# Patient Record
Sex: MALE | Race: WHITE | NOT HISPANIC OR LATINO | URBAN - METROPOLITAN AREA
[De-identification: names, ages, dates, MRNs, and addresses within clinical notes are randomized per-mention and may not be internally consistent; named-entity substitution may affect disease eponyms.]

---

## 2019-10-24 ENCOUNTER — OFFICE (OUTPATIENT)
Dept: URBAN - METROPOLITAN AREA CLINIC 33 | Facility: CLINIC | Age: 69
End: 2019-10-24
Payer: MEDICAID

## 2019-10-24 ENCOUNTER — OFFICE (OUTPATIENT)
Dept: URBAN - METROPOLITAN AREA CLINIC 33 | Facility: CLINIC | Age: 69
End: 2019-10-24

## 2019-10-24 VITALS
WEIGHT: 150 LBS | SYSTOLIC BLOOD PRESSURE: 132 MMHG | TEMPERATURE: 97.8 F | HEART RATE: 86 BPM | DIASTOLIC BLOOD PRESSURE: 82 MMHG | HEIGHT: 69 IN

## 2019-10-24 DIAGNOSIS — Z12.11 ENCOUNTER FOR SCREENING FOR MALIGNANT NEOPLASM OF COLON: ICD-10-CM

## 2019-10-24 DIAGNOSIS — K21.9 GASTRO-ESOPHAGEAL REFLUX DISEASE WITHOUT ESOPHAGITIS: ICD-10-CM

## 2019-10-24 DIAGNOSIS — R10.13 EPIGASTRIC PAIN: ICD-10-CM

## 2019-10-24 PROCEDURE — 00031: CPT

## 2019-10-24 PROCEDURE — 99204 OFFICE O/P NEW MOD 45 MIN: CPT

## 2019-10-24 NOTE — SERVICEHPINOTES
YAYO ALCANTARA   is a   69   male who presents with abdominal pain. Has been experiencing epigastric pain ove a couple of months. The pain is worse on empty stomach. BRDenies nausea, vomiting or dysphagia. Lost about 3-4 lbs since was not eating due to the pain. BR+ Acid reflux, Prilosec was changed to pantoprazole 40 mg since 2 weeks ago by pcp. It seems to help with the pain. CT of abdomen and pelvis  showed no acute pathology but mild hepatic steatosis and 4 mm pulmonary nodule.BRTakes Advil once a week for body aches. BRMoves bowel daily. Denies blood in stool, melena, constipation, diarrhea or lower abdominal pain. Never had a colonoscopy or EGD. Denies chest pain, palpitations or sob. BRDenies hx of cardiovascular disease. BR

## 2019-10-30 ENCOUNTER — ON CAMPUS - OUTPATIENT (OUTPATIENT)
Dept: URBAN - METROPOLITAN AREA HOSPITAL 14 | Facility: HOSPITAL | Age: 69
End: 2019-10-30
Payer: MEDICAID

## 2019-10-30 DIAGNOSIS — R10.13 EPIGASTRIC PAIN: ICD-10-CM

## 2019-10-30 DIAGNOSIS — K21.9 GASTRO-ESOPHAGEAL REFLUX DISEASE WITHOUT ESOPHAGITIS: ICD-10-CM

## 2019-10-30 PROCEDURE — 43239 EGD BIOPSY SINGLE/MULTIPLE: CPT

## 2019-11-12 ENCOUNTER — ON CAMPUS - OUTPATIENT (OUTPATIENT)
Dept: URBAN - METROPOLITAN AREA HOSPITAL 35 | Facility: HOSPITAL | Age: 69
End: 2019-11-12
Payer: MEDICARE

## 2019-11-12 DIAGNOSIS — Z12.11 ENCOUNTER FOR SCREENING FOR MALIGNANT NEOPLASM OF COLON: ICD-10-CM

## 2019-11-12 PROCEDURE — G0121 COLON CA SCRN NOT HI RSK IND: HCPCS
